# Patient Record
Sex: FEMALE | Race: ASIAN | NOT HISPANIC OR LATINO
[De-identification: names, ages, dates, MRNs, and addresses within clinical notes are randomized per-mention and may not be internally consistent; named-entity substitution may affect disease eponyms.]

---

## 2024-05-08 VITALS
HEART RATE: 66 BPM | OXYGEN SATURATION: 96 % | RESPIRATION RATE: 16 BRPM | SYSTOLIC BLOOD PRESSURE: 164 MMHG | HEIGHT: 62 IN | TEMPERATURE: 97 F | WEIGHT: 123.02 LBS | DIASTOLIC BLOOD PRESSURE: 77 MMHG

## 2024-05-08 RX ORDER — DEXTROSE 50 % IN WATER 50 %
25 SYRINGE (ML) INTRAVENOUS ONCE
Refills: 0 | Status: DISCONTINUED | OUTPATIENT
Start: 2024-05-09 | End: 2024-05-23

## 2024-05-08 RX ORDER — CHLORHEXIDINE GLUCONATE 213 G/1000ML
1 SOLUTION TOPICAL ONCE
Refills: 0 | Status: DISCONTINUED | OUTPATIENT
Start: 2024-05-09 | End: 2024-05-23

## 2024-05-08 RX ORDER — GLUCAGON INJECTION, SOLUTION 0.5 MG/.1ML
1 INJECTION, SOLUTION SUBCUTANEOUS ONCE
Refills: 0 | Status: DISCONTINUED | OUTPATIENT
Start: 2024-05-09 | End: 2024-05-23

## 2024-05-08 RX ORDER — SODIUM CHLORIDE 9 MG/ML
1000 INJECTION, SOLUTION INTRAVENOUS
Refills: 0 | Status: DISCONTINUED | OUTPATIENT
Start: 2024-05-09 | End: 2024-05-23

## 2024-05-08 RX ORDER — DEXTROSE 50 % IN WATER 50 %
15 SYRINGE (ML) INTRAVENOUS ONCE
Refills: 0 | Status: DISCONTINUED | OUTPATIENT
Start: 2024-05-09 | End: 2024-05-23

## 2024-05-08 RX ORDER — DEXTROSE 50 % IN WATER 50 %
12.5 SYRINGE (ML) INTRAVENOUS ONCE
Refills: 0 | Status: DISCONTINUED | OUTPATIENT
Start: 2024-05-09 | End: 2024-05-23

## 2024-05-08 RX ORDER — DEXTROSE 10 % IN WATER 10 %
125 INTRAVENOUS SOLUTION INTRAVENOUS ONCE
Refills: 0 | Status: DISCONTINUED | OUTPATIENT
Start: 2024-05-09 | End: 2024-05-23

## 2024-05-08 RX ORDER — ASPIRIN/CALCIUM CARB/MAGNESIUM 324 MG
81 TABLET ORAL DAILY
Refills: 0 | Status: DISCONTINUED | OUTPATIENT
Start: 2024-05-09 | End: 2024-05-23

## 2024-05-08 RX ORDER — INSULIN LISPRO 100/ML
VIAL (ML) SUBCUTANEOUS EVERY 6 HOURS
Refills: 0 | Status: DISCONTINUED | OUTPATIENT
Start: 2024-05-09 | End: 2024-05-23

## 2024-05-08 NOTE — H&P ADULT - ASSESSMENT
89F, Greenlandic-speaking, with hx of HTN, HLD, mod-severe MR, DM2, and Stage 4 CKD (baseline Cr 1.53-1.7), who initially presented to her cardiologist Dr. Iqbal c/o CP, REYNOLDS (max 2-3 blocks), and LE edema. In light of pt's risk factors and CCS Class III anginal symptoms, pt is referred to [facility] for cardiac catheterization and revascularization if clinically indicated.    - VSS; denies bleeding, GI bleeding, hematemesis, hematuria, BRBPR or melena  - ASA II, Mallampati II  - Sedation Type: Moderate   - Is Patient a good candidate for sedation: YES  - H 11.2 / H 33.4, Plt 171 -- DAPT: ASA 81mg + Plavix 600mg x1 to be given pre-cath   - Cr 1.98 (has Stage 4 CKD with b/l Cr 1.53-1.7); Euvolemic on exam -- Fluids: NS 50cc x 2h  - NPO except sips with meds    Risks & benefits of procedure and alternative therapy have been explained to the patient including but not limited to: allergic reaction, bleeding w/possible need for blood transfusion, infection, renal and vascular compromise, limb damage, arrhythmia, stroke, vessel dissection/perforation, Myocardial infarction, emergent CABG. Informed consent obtained and in chart.

## 2024-05-08 NOTE — H&P ADULT - NSICDXPASTMEDICALHX_GEN_ALL_CORE_FT
PAST MEDICAL HISTORY:  Dyslipidemia     Hypertension     Mitral regurgitation     Type 2 diabetes mellitus      PAST MEDICAL HISTORY:  Dyslipidemia     Hypertension     Mitral regurgitation     Stage 4 chronic kidney disease     Type 2 diabetes mellitus

## 2024-05-08 NOTE — H&P ADULT - HISTORY OF PRESENT ILLNESS
Cardiologist: Dr. Orlando Iqbal  Escort:  Pharmacy:     **Pt on schedule for LHC only, verify the pt does not need ADDIE as well**    89 y/o F w/ history of DM, HTN, HLD, moderate to severe MR whom presents to cardiologist Dr. Jaxon Hanley for CP and SOB symptoms __________. Her symptoms limit her exercise tolerance. Pt also admits to bilateral leg edema. Denies orthopnea, PND, syncope, palpitations, n/v, fever, chills or any other sx. Pt was admitted to [facility] and told she had severe MR. Echocardiogram 4/22/24 revealed EF 57%, mod to severe MR. Given class III anginal symptoms, abnormal TTE and risk factors for CAD, plan for LHC +/- ADDIE due to symptomatic severe MR.  Cardiologist: Dr. Orlando Iqbal  Escort:  Pharmacy:     **Pt on schedule for LHC only, verify the pt does not need ADDIE as well** (In Dr. Iqbal note it says ADDIE/LHC)    88 y/o F w/ history of DM, HTN, HLD, moderate to severe MR whom presents to cardiologist Dr. Jaxon Hanley for CP and SOB symptoms __________. Her symptoms limit her exercise tolerance. Pt also admits to bilateral leg edema. Denies orthopnea, PND, syncope, palpitations, n/v, fever, chills or any other sx. Pt was admitted to [facility] and told she had severe MR. Echocardiogram 4/22/24 revealed EF 57%, mod to severe MR. Given class III anginal symptoms, abnormal TTE and risk factors for CAD, plan for LHC +/- ADDIE due to symptomatic severe MR.  Cardiologist: Dr. Orlando Iqbal  Escort:  Pharmacy:     **Pt on schedule for LHC only, however Dr. Iqbal requested ADDIE/LHC** Make sure consent obtained prior to ADDIE    88 y/o F w/ history of DM, HTN, HLD, moderate to severe MR whom presents to cardiologist Dr. Orlando Iqbal for CP and SOB symptoms __________. Her symptoms limit her exercise tolerance. Pt also admits to bilateral leg edema. Denies orthopnea, PND, syncope, palpitations, n/v, fever, chills or any other sx. Pt was admitted to [facility] and told she had severe MR. Echocardiogram 4/22/24 revealed EF 57%, mod to severe MR. Given class III anginal symptoms, abnormal TTE and risk factors for CAD, plan for C +/- ADDIE due to symptomatic severe MR.  Cardiologist: Dr. Orlando Iqbal  Escort:  Pharmacy:     **Pt on schedule for LHC only, however Dr. Iqbal requested ADDIE/LHC** Make sure consent obtained prior to ADDIE    89 y/o F w/ history of DM, HTN, HLD, moderate to severe MR whom presents to cardiologist Dr. Orlando Iqbal for CP and SOB symptoms __________. Her symptoms limit her exercise tolerance. Pt also admits to bilateral leg edema. Denies orthopnea, PND, syncope, palpitations, n/v, fever, chills or any other sx. Pt was admitted to [facility] and told she had severe MR. Echocardiogram 4/22/24 revealed EF 57%, mod to severe MR. Given class III anginal symptoms, abnormal TTE and risk factors for CAD, plan for LHC +/ ADDIE due to symptomatic severe MR.  Cardiologist: Dr. Orlando Iqbal  Escort:  Pharmacy:     89 y/o F w/ history of DM, HTN, HLD, moderate to severe MR whom presents to cardiologist Dr. Orlando Iqbal for CP and SOB symptoms __________. Her symptoms limit her exercise tolerance. Pt also admits to bilateral leg edema. Denies orthopnea, PND, syncope, palpitations, n/v, fever, chills or any other sx. Pt was admitted to [facility] and told she had severe MR. Echocardiogram 4/22/24 revealed EF 57%, mod to severe MR. Given class III anginal symptoms, abnormal TTE and risk factors for CAD, plan for LHC +/ ADDIE due to symptomatic severe MR.     Patient is s/p ADDIE today (5/9/2024) revealing moderate MR,  Aortic sclerosis without significant stenosis.moderate non-mobile plaque seen in the visualized portion of  the descending aorta. There is moderate non-mobile plaque seen in the visualized portion of the aortic arch.   Cardiologist: Dr. Orlando Iqbal  Pharmacy:   Escort: Daughter     89F, Vietnamese-speaking, with hx of HTN, HLD, mod-severe MR, DM2, and Stage 4 CKD (baseline Cr 1.53-1.7), who initially presented to her cardiologist Dr. Iqbal c/o CP, REYNOLDS (max 2-3 blocks), and LE edema.     Currently, pt denies any fatigue, weakness, headache, dizziness/lightheadedness, CP, palpitations, SOB, REYNOLDS, orthopnea/PND, LE edema, N/V, abdominal discomfort, melena, BRBPR, hematemesis, hematuria, or recent sick contacts/travel.    In light of pt's risk factors and CCS Class III anginal symptoms, pt is referred to [facility] for cardiac catheterization and revascularization if clinically indicated.    ADDIE (5/09/24): Moderate MR. Aortic sclerosis without significant stenosis and moderate non-mobile plaque seen in the visualized portion of the aortic arch and descending aorta  TTE (4/25/24): EF 57%, mod dilated LA. Mod-severe MR

## 2024-05-08 NOTE — H&P ADULT - NSHPLABSRESULTS_GEN_ALL_CORE
11.2   6.38  )-----------( 171      ( 09 May 2024 08:06 )             33.4     05-09    141  |  103  |  30<H>  ----------------------------<  106<H>  5.0   |  25  |  1.98<H>    Ca    9.8      09 May 2024 08:06  Mg     1.9     05-09    TPro  7.3  /  Alb  4.1  /  TBili  0.6  /  DBili  x   /  AST  25  /  ALT  16  /  AlkPhos  117  05-09    PT/INR - ( 09 May 2024 08:06 )   PT: 9.7 sec;   INR: 0.85          PTT - ( 09 May 2024 08:06 )  PTT:33.7 sec

## 2024-05-09 ENCOUNTER — RESULT REVIEW (OUTPATIENT)
Age: 89
End: 2024-05-09

## 2024-05-09 ENCOUNTER — OUTPATIENT (OUTPATIENT)
Dept: OUTPATIENT SERVICES | Facility: HOSPITAL | Age: 89
LOS: 1 days | Discharge: HOME | End: 2024-05-09
Payer: MEDICARE

## 2024-05-09 PROBLEM — Z00.00 ENCOUNTER FOR PREVENTIVE HEALTH EXAMINATION: Status: ACTIVE | Noted: 2024-05-09

## 2024-05-09 LAB
A1C WITH ESTIMATED AVERAGE GLUCOSE RESULT: 5.9 % — HIGH (ref 4–5.6)
ALBUMIN SERPL ELPH-MCNC: 4.1 G/DL — SIGNIFICANT CHANGE UP (ref 3.3–5)
ALP SERPL-CCNC: 117 U/L — SIGNIFICANT CHANGE UP (ref 40–120)
ALT FLD-CCNC: 16 U/L — SIGNIFICANT CHANGE UP (ref 10–45)
ANION GAP SERPL CALC-SCNC: 13 MMOL/L — SIGNIFICANT CHANGE UP (ref 5–17)
APTT BLD: 33.7 SEC — SIGNIFICANT CHANGE UP (ref 24.5–35.6)
AST SERPL-CCNC: 25 U/L — SIGNIFICANT CHANGE UP (ref 10–40)
BASOPHILS # BLD AUTO: 0.04 K/UL — SIGNIFICANT CHANGE UP (ref 0–0.2)
BASOPHILS NFR BLD AUTO: 0.6 % — SIGNIFICANT CHANGE UP (ref 0–2)
BILIRUB SERPL-MCNC: 0.6 MG/DL — SIGNIFICANT CHANGE UP (ref 0.2–1.2)
BUN SERPL-MCNC: 30 MG/DL — HIGH (ref 7–23)
CALCIUM SERPL-MCNC: 9.8 MG/DL — SIGNIFICANT CHANGE UP (ref 8.4–10.5)
CHLORIDE SERPL-SCNC: 103 MMOL/L — SIGNIFICANT CHANGE UP (ref 96–108)
CHOLEST SERPL-MCNC: 156 MG/DL — SIGNIFICANT CHANGE UP
CK MB CFR SERPL CALC: 3.1 NG/ML — SIGNIFICANT CHANGE UP (ref 0–6.7)
CK SERPL-CCNC: 171 U/L — HIGH (ref 25–170)
CO2 SERPL-SCNC: 25 MMOL/L — SIGNIFICANT CHANGE UP (ref 22–31)
CREAT SERPL-MCNC: 1.98 MG/DL — HIGH (ref 0.5–1.3)
EGFR: 24 ML/MIN/1.73M2 — LOW
EOSINOPHIL # BLD AUTO: 0.28 K/UL — SIGNIFICANT CHANGE UP (ref 0–0.5)
EOSINOPHIL NFR BLD AUTO: 4.4 % — SIGNIFICANT CHANGE UP (ref 0–6)
ESTIMATED AVERAGE GLUCOSE: 123 MG/DL — HIGH (ref 68–114)
GLUCOSE BLDC GLUCOMTR-MCNC: 101 MG/DL — HIGH (ref 70–99)
GLUCOSE BLDC GLUCOMTR-MCNC: 112 MG/DL — HIGH (ref 70–99)
GLUCOSE SERPL-MCNC: 106 MG/DL — HIGH (ref 70–99)
HCT VFR BLD CALC: 33.4 % — LOW (ref 34.5–45)
HDLC SERPL-MCNC: 48 MG/DL — LOW
HGB BLD-MCNC: 11.2 G/DL — LOW (ref 11.5–15.5)
IMM GRANULOCYTES NFR BLD AUTO: 0.3 % — SIGNIFICANT CHANGE UP (ref 0–0.9)
INR BLD: 0.85 — SIGNIFICANT CHANGE UP (ref 0.85–1.18)
LIPID PNL WITH DIRECT LDL SERPL: 84 MG/DL — SIGNIFICANT CHANGE UP
LYMPHOCYTES # BLD AUTO: 1.96 K/UL — SIGNIFICANT CHANGE UP (ref 1–3.3)
LYMPHOCYTES # BLD AUTO: 30.7 % — SIGNIFICANT CHANGE UP (ref 13–44)
MAGNESIUM SERPL-MCNC: 1.9 MG/DL — SIGNIFICANT CHANGE UP (ref 1.6–2.6)
MCHC RBC-ENTMCNC: 32.8 PG — SIGNIFICANT CHANGE UP (ref 27–34)
MCHC RBC-ENTMCNC: 33.5 GM/DL — SIGNIFICANT CHANGE UP (ref 32–36)
MCV RBC AUTO: 97.9 FL — SIGNIFICANT CHANGE UP (ref 80–100)
MONOCYTES # BLD AUTO: 0.58 K/UL — SIGNIFICANT CHANGE UP (ref 0–0.9)
MONOCYTES NFR BLD AUTO: 9.1 % — SIGNIFICANT CHANGE UP (ref 2–14)
NEUTROPHILS # BLD AUTO: 3.5 K/UL — SIGNIFICANT CHANGE UP (ref 1.8–7.4)
NEUTROPHILS NFR BLD AUTO: 54.9 % — SIGNIFICANT CHANGE UP (ref 43–77)
NON HDL CHOLESTEROL: 108 MG/DL — SIGNIFICANT CHANGE UP
NRBC # BLD: 0 /100 WBCS — SIGNIFICANT CHANGE UP (ref 0–0)
PLATELET # BLD AUTO: 171 K/UL — SIGNIFICANT CHANGE UP (ref 150–400)
POTASSIUM SERPL-MCNC: 5 MMOL/L — SIGNIFICANT CHANGE UP (ref 3.5–5.3)
POTASSIUM SERPL-SCNC: 5 MMOL/L — SIGNIFICANT CHANGE UP (ref 3.5–5.3)
PROT SERPL-MCNC: 7.3 G/DL — SIGNIFICANT CHANGE UP (ref 6–8.3)
PROTHROM AB SERPL-ACNC: 9.7 SEC — SIGNIFICANT CHANGE UP (ref 9.5–13)
RBC # BLD: 3.41 M/UL — LOW (ref 3.8–5.2)
RBC # FLD: 11.9 % — SIGNIFICANT CHANGE UP (ref 10.3–14.5)
SODIUM SERPL-SCNC: 141 MMOL/L — SIGNIFICANT CHANGE UP (ref 135–145)
TRIGL SERPL-MCNC: 139 MG/DL — SIGNIFICANT CHANGE UP
WBC # BLD: 6.38 K/UL — SIGNIFICANT CHANGE UP (ref 3.8–10.5)
WBC # FLD AUTO: 6.38 K/UL — SIGNIFICANT CHANGE UP (ref 3.8–10.5)

## 2024-05-09 PROCEDURE — 82553 CREATINE MB FRACTION: CPT

## 2024-05-09 PROCEDURE — 93454 CORONARY ARTERY ANGIO S&I: CPT | Mod: 26

## 2024-05-09 PROCEDURE — 83036 HEMOGLOBIN GLYCOSYLATED A1C: CPT

## 2024-05-09 PROCEDURE — 93005 ELECTROCARDIOGRAM TRACING: CPT

## 2024-05-09 PROCEDURE — 85610 PROTHROMBIN TIME: CPT

## 2024-05-09 PROCEDURE — 82550 ASSAY OF CK (CPK): CPT

## 2024-05-09 PROCEDURE — 36415 COLL VENOUS BLD VENIPUNCTURE: CPT

## 2024-05-09 PROCEDURE — C1887: CPT

## 2024-05-09 PROCEDURE — 93010 ELECTROCARDIOGRAM REPORT: CPT

## 2024-05-09 PROCEDURE — 93320 DOPPLER ECHO COMPLETE: CPT | Mod: 26

## 2024-05-09 PROCEDURE — 85025 COMPLETE CBC W/AUTO DIFF WBC: CPT

## 2024-05-09 PROCEDURE — C1894: CPT

## 2024-05-09 PROCEDURE — 93325 DOPPLER ECHO COLOR FLOW MAPG: CPT | Mod: 26

## 2024-05-09 PROCEDURE — C1769: CPT

## 2024-05-09 PROCEDURE — 0523T NTRAPX C FFR W/3D FUNCJL MAP: CPT

## 2024-05-09 PROCEDURE — 85730 THROMBOPLASTIN TIME PARTIAL: CPT

## 2024-05-09 PROCEDURE — 93454 CORONARY ARTERY ANGIO S&I: CPT

## 2024-05-09 PROCEDURE — 99152 MOD SED SAME PHYS/QHP 5/>YRS: CPT

## 2024-05-09 PROCEDURE — C8925: CPT

## 2024-05-09 PROCEDURE — 82962 GLUCOSE BLOOD TEST: CPT

## 2024-05-09 PROCEDURE — 93312 ECHO TRANSESOPHAGEAL: CPT | Mod: 26

## 2024-05-09 PROCEDURE — 80061 LIPID PANEL: CPT

## 2024-05-09 PROCEDURE — 80053 COMPREHEN METABOLIC PANEL: CPT

## 2024-05-09 PROCEDURE — 83735 ASSAY OF MAGNESIUM: CPT

## 2024-05-09 RX ORDER — ATORVASTATIN CALCIUM 80 MG/1
1 TABLET, FILM COATED ORAL
Refills: 0 | DISCHARGE

## 2024-05-09 RX ORDER — FUROSEMIDE 40 MG
10 TABLET ORAL ONCE
Refills: 0 | Status: COMPLETED | OUTPATIENT
Start: 2024-05-09 | End: 2024-05-09

## 2024-05-09 RX ORDER — SODIUM CHLORIDE 9 MG/ML
1000 INJECTION INTRAMUSCULAR; INTRAVENOUS; SUBCUTANEOUS
Refills: 0 | Status: COMPLETED | OUTPATIENT
Start: 2024-05-09 | End: 2024-05-09

## 2024-05-09 RX ORDER — SODIUM CHLORIDE 9 MG/ML
1000 INJECTION INTRAMUSCULAR; INTRAVENOUS; SUBCUTANEOUS
Refills: 0 | Status: DISCONTINUED | OUTPATIENT
Start: 2024-05-09 | End: 2024-05-23

## 2024-05-09 RX ORDER — ICOSAPENT ETHYL 500 MG/1
2 CAPSULE, LIQUID FILLED ORAL
Refills: 0 | DISCHARGE

## 2024-05-09 RX ORDER — LISINOPRIL 2.5 MG/1
1 TABLET ORAL
Refills: 0 | DISCHARGE

## 2024-05-09 RX ORDER — SITAGLIPTIN 50 MG/1
1 TABLET, FILM COATED ORAL
Refills: 0 | DISCHARGE

## 2024-05-09 RX ORDER — CLOPIDOGREL BISULFATE 75 MG/1
600 TABLET, FILM COATED ORAL ONCE
Refills: 0 | Status: COMPLETED | OUTPATIENT
Start: 2024-05-09 | End: 2024-05-09

## 2024-05-09 RX ORDER — AMLODIPINE BESYLATE 2.5 MG/1
1 TABLET ORAL
Refills: 0 | DISCHARGE

## 2024-05-09 RX ORDER — ASPIRIN/CALCIUM CARB/MAGNESIUM 324 MG
1 TABLET ORAL
Refills: 0 | DISCHARGE

## 2024-05-09 RX ORDER — FUROSEMIDE 40 MG
1 TABLET ORAL
Refills: 0 | DISCHARGE

## 2024-05-09 RX ORDER — ATENOLOL 25 MG/1
1 TABLET ORAL
Refills: 0 | DISCHARGE

## 2024-05-09 RX ADMIN — Medication 10 MILLIGRAM(S): at 17:25

## 2024-05-09 RX ADMIN — Medication 81 MILLIGRAM(S): at 14:00

## 2024-05-09 RX ADMIN — CLOPIDOGREL BISULFATE 600 MILLIGRAM(S): 75 TABLET, FILM COATED ORAL at 13:59

## 2024-05-09 RX ADMIN — SODIUM CHLORIDE 50 MILLILITER(S): 9 INJECTION INTRAMUSCULAR; INTRAVENOUS; SUBCUTANEOUS at 14:01

## 2024-05-09 NOTE — PROGRESS NOTE ADULT - SUBJECTIVE AND OBJECTIVE BOX
Interventional Cardiology  Post  Diagnostic Catheterization  Discharge Note      Patient without complaints. Ambulated and voided without difficulties    Afebrile, VSS    Ext:    		   		Right               Radial :  no  hematoma,   no  bleeding, dressing; C/D/I      Pulses:    intact RAD/DP/PT to baseline     A/P:  89F, Nepali-speaking, with hx of HTN, HLD, mod-severe MR, DM2, and Stage 4 CKD (baseline Cr 1.53-1.7), who initially presented to her cardiologist Dr. Iqbal c/o CP, REYNOLDS (max 2-3 blocks), and LE edema. In light of pt's risk factors and CCS Class III anginal symptoms, pt is referred to [facility] for cardiac catheterization and revascularization if clinically indicated.  s/p LHC that revealed no obstructive coronary disease. pLAD 30% FFR- and OM1 30-50% FFR-   1.	Follow-up with PMD/Cardiologist  Rasheed in 1week.   2.                 Pt given instructions on importance of post groin/radial access site care. .    3. 	Stable for discharge as per attending Dr. Burgos after bed rest, pt voids, groin/wrist stable and 30 minutes of ambulation.

## 2024-05-14 DIAGNOSIS — I25.110 ATHEROSCLEROTIC HEART DISEASE OF NATIVE CORONARY ARTERY WITH UNSTABLE ANGINA PECTORIS: ICD-10-CM
